# Patient Record
Sex: FEMALE | Race: WHITE
[De-identification: names, ages, dates, MRNs, and addresses within clinical notes are randomized per-mention and may not be internally consistent; named-entity substitution may affect disease eponyms.]

---

## 2018-12-17 ENCOUNTER — HOSPITAL ENCOUNTER (EMERGENCY)
Dept: HOSPITAL 80 - FED | Age: 38
Discharge: HOME | End: 2018-12-17
Payer: COMMERCIAL

## 2018-12-17 VITALS — DIASTOLIC BLOOD PRESSURE: 98 MMHG | SYSTOLIC BLOOD PRESSURE: 149 MMHG

## 2018-12-17 DIAGNOSIS — E11.9: ICD-10-CM

## 2018-12-17 DIAGNOSIS — J09.X2: Primary | ICD-10-CM

## 2018-12-17 NOTE — EDPHY
H & P


Stated Complaint: flu sx


Time Seen by Provider: 18 06:40


HPI/ROS: 





CHIEF COMPLAINT:  Body aches, sinus congestion, cough





HISTORY OF PRESENT ILLNESS:  Patient is a 38-year-old female comes to the 

emergency department concerned for the flu.  She has had 2 days of body aches, 

cough and sinus congestion.  She does not think she has had a fever.  She did 

get the flu vaccination this year.  No sore throat.  No difficulty breathing.  

No chest pain.  No headache.


Severity:  Moderate


Modifying factors:  None





REVIEW OF SYSTEMS:


Constitutional:  See above


EENTM:  See above


Respiratory: denies: cough, shortness of breath


Cardiac: denies: chest pain, irregular heart rate, lightheadedness, palpitations


Gastrointestinal/Abdominal: denies: abdominal pain, diarrhea, nausea, vomiting, 

blood streaked stools


Genitourinary: denies: dysuria, frequency, hematuria, pain


Musculoskeletal: denies: joint pain, muscle pain


Skin: denies: lesions, rash, jaundice, bruising


Neurological: denies: headache, numbness, paresthesia, tingling, dizziness, 

weakness


Hematologic/Lymphatic: denies: blood clots, easy bleeding, easy bruising


Immunologic/allergic: denies: HIV/AIDS, transplant


 10 systems reviewed and negative except as noted





EXAM:


GENERAL:  Well-appearing, well-nourished and in no acute distress.


HEAD:  Atraumatic, normocephalic.


EYES:  Pupils equal round and reactive to light, extraocular movements intact, 

sclera anicteric, conjunctiva are normal.


ENT:  TMs normal, nares patent, oropharynx clear without exudates.  Moist 

mucous membranes.


NECK:  Normal range of motion, supple without lymphadenopathy or JVD.


LUNGS:  Breath sounds clear to auscultation bilaterally and equal.  No wheezes 

rales or rhonchi.


HEART:  Regular rate and rhythm without murmurs, rubs or gallops.


ABDOMEN:  Soft, nontender, normoactive bowel sounds.  No guarding, no rebound.  

No masses appreciated. 


BACK:  No CVA tenderness, no spinal tenderness, step-offs or deformities


EXTREMITIES:  Normal range of motion, no pitting or edema.  No clubbing or 

cyanosis.


NEUROLOGICAL:  Cranial nerves II through XII grossly intact.  Normal speech, 

normal gait.  5/5 strength, normal movement in all extremities, normal sensation

, normal reflexes


PSYCH:  Normal mood, normal affect.


SKIN:  Warm, dry, normal turgor, no visible rashes or lesions.








Source: Patient


Exam Limitations: No limitations





- Personal History


LMP (Females 10-55): 1-7 Days Ago


Current Tetanus/Diphtheria Vaccine: Yes


Current Tetanus Diphtheria and Acellular Pertussis (TDAP): Yes





- Medical/Surgical History


Hx Asthma: No


Hx Chronic Respiratory Disease: No


Hx Diabetes: No


Hx Cardiac Disease: No


Hx Renal Disease: No


Hx Cirrhosis: No


Hx Alcoholism: No


Hx HIV/AIDS: No


Hx Splenectomy or Spleen Trauma: No


Other PMH: diabetes type 2 - metfromin, 





- Family History


Significant Family History: No pertinent family hx





- Social History


Smoking Status: Never smoked


Alcohol Use: Sober


Drug Use: None


Constitutional: 


 Initial Vital Signs











Temperature (C)  37 C   18 06:18


 


Heart Rate  82   18 06:18


 


Respiratory Rate  18   18 06:18


 


Blood Pressure  165/98 H  18 06:18


 


O2 Sat (%)  97   18 06:18








 











O2 Delivery Mode               Room Air














Allergies/Adverse Reactions: 


 





No Known Allergies Allergy (Unverified 10/29/12 11:02)


 








Home Medications: 














 Medication  Instructions  Recorded


 


metFORMIN HCL [Glumetza 500 mg] 1,000 mg PO BID 10/29/12


 


metFORMIN HCL [Metformin HCl] 500 mg PO 11/09/15


 


Ferrous Sulfate  18


 


Folic Acid  18


 


Lisinopril  18


 


Meloxicam  18


 


Methotrexate 1 gm Vial  18


 


Ranitidine HCl  18














Medical Decision Making


ED Course/Re-evaluation: 





Patient has influenza.  We encouraged hydration and rest.  It is been greater 

than 48 hr since onset and Tamiflu is likely not to benefit her.  She is 

otherwise healthy.  I discussed contact precautions.  She is otherwise stable 

and well-appearing.  Will discharge at this time.  Discussed indications for 

returning.


Differential Diagnosis: 





Partial list of the Differential diagnosis considered include but were not 

limited to;  viral syndrome, influenza and although unlikely based on the 

history and physical exam, I also considered sepsis, meningitis, pneumonia, 

strep throat.  I discussed these differential diagnoses and the plan with the 

patient as well as the usual and expected course.  The patient understands that 

the diagnosis is provisional and that in medicine we are not always correct and 

that further workup is often warranted.  Usual and customary warnings were 

given.  All of the patient's questions were answered.  The patient was 

instructed to return to the emergency department should the symptoms at all 

worsen or return, otherwise to followup with the physician as we discussed.





- Data Points


Laboratory Results: 


 











  18





  06:30


 


Nasal Influenza A PCR  FLU A DETECTED  H 





   (NEGATIVE) 


 


Nasal Influenza B PCR  NEGATIVE FOR FLU B 





   (NEGATIVE) 














Departure





- Departure


Disposition: Home, Routine, Self-Care


Clinical Impression: 


 Influenza A





Condition: Fair


Instructions:  Influenza (ED)


Referrals: 


Patient,NotPresent [Unknown] - As per Instructions


[facility],. [Primary Care Provider] - As per Instructions